# Patient Record
Sex: FEMALE | Employment: UNEMPLOYED | URBAN - NONMETROPOLITAN AREA
[De-identification: names, ages, dates, MRNs, and addresses within clinical notes are randomized per-mention and may not be internally consistent; named-entity substitution may affect disease eponyms.]

---

## 2018-03-12 ENCOUNTER — OFFICE VISIT (OUTPATIENT)
Dept: ENDOCRINOLOGY | Age: 78
End: 2018-03-12
Payer: MEDICARE

## 2018-03-12 ENCOUNTER — HOSPITAL ENCOUNTER (OUTPATIENT)
Age: 78
Discharge: HOME | End: 2018-03-12
Payer: MEDICARE

## 2018-03-12 ENCOUNTER — TELEPHONE (OUTPATIENT)
Dept: ENDOCRINOLOGY | Age: 78
End: 2018-03-12

## 2018-03-12 VITALS
SYSTOLIC BLOOD PRESSURE: 138 MMHG | WEIGHT: 168.5 LBS | HEART RATE: 67 BPM | DIASTOLIC BLOOD PRESSURE: 70 MMHG | RESPIRATION RATE: 14 BRPM | HEIGHT: 62 IN | BODY MASS INDEX: 31.01 KG/M2

## 2018-03-12 DIAGNOSIS — E11.9 TYPE 2 DIABETES MELLITUS WITHOUT COMPLICATION, WITHOUT LONG-TERM CURRENT USE OF INSULIN (HCC): ICD-10-CM

## 2018-03-12 DIAGNOSIS — I10 ESSENTIAL HYPERTENSION: ICD-10-CM

## 2018-03-12 LAB
ANION GAP SERPL CALCULATED.3IONS-SCNC: 13 MEQ/L (ref 8–16)
BUN BLDV-MCNC: 13 MG/DL (ref 7–22)
CALCIUM SERPL-MCNC: 9.8 MG/DL (ref 8.5–10.5)
CHLORIDE BLD-SCNC: 103 MEQ/L (ref 98–111)
CO2: 26 MEQ/L (ref 23–33)
CREAT SERPL-MCNC: 0.6 MG/DL (ref 0.4–1.2)
CREATININE, URINE: 113.5 MG/DL
GFR SERPL CREATININE-BSD FRML MDRD: > 90 ML/MIN/1.73M2
GLUCOSE BLD-MCNC: 228 MG/DL (ref 70–108)
HBA1C MFR BLD: 9.5 %
MICROALBUMIN UR-MCNC: 1.52 MG/DL
MICROALBUMIN/CREAT UR-RTO: 13 MG/G (ref 0–30)
POTASSIUM SERPL-SCNC: 4.3 MEQ/L (ref 3.5–5.2)
SODIUM BLD-SCNC: 142 MEQ/L (ref 135–145)

## 2018-03-12 PROCEDURE — 82043 UR ALBUMIN QUANTITATIVE: CPT

## 2018-03-12 PROCEDURE — 36415 COLL VENOUS BLD VENIPUNCTURE: CPT

## 2018-03-12 PROCEDURE — 83036 HEMOGLOBIN GLYCOSYLATED A1C: CPT | Performed by: INTERNAL MEDICINE

## 2018-03-12 PROCEDURE — 80048 BASIC METABOLIC PNL TOTAL CA: CPT

## 2018-03-12 PROCEDURE — 99204 OFFICE O/P NEW MOD 45 MIN: CPT | Performed by: INTERNAL MEDICINE

## 2018-03-12 RX ORDER — LISINOPRIL AND HYDROCHLOROTHIAZIDE 20; 12.5 MG/1; MG/1
1 TABLET ORAL DAILY
COMMUNITY

## 2018-03-12 RX ORDER — GLYBURIDE 5 MG/1
5 TABLET ORAL
COMMUNITY
End: 2018-03-12 | Stop reason: DRUGHIGH

## 2018-03-12 RX ORDER — GLIMEPIRIDE 4 MG/1
4 TABLET ORAL 2 TIMES DAILY
Qty: 90 TABLET | Refills: 3 | Status: SHIPPED | OUTPATIENT
Start: 2018-03-12

## 2018-03-12 NOTE — PROGRESS NOTES
Endocrinology Office Visit  Beny Taylor MD  3/12/2018      Patient's Name/Date of Birth: Mariana Sarah / 1940 (78 y.o.)    SUBJECTIVE:       Chief Complaint   Patient presents with   [facility]     new patient referred by Dr Flaco Lomeli Diabetes     type 2    Foot Problem     denies any problems     Eye Exam     2017-[place] In [facility]ight (called for report today at 10:47 am)         Sam Sanchez presents to the office today at [facility] for consultation of Type 2 DM. Referred by Dr. Erin Velez. Date of diagnosis: Unsure of when she was diagnosed. Complications:none    Current regimen: glyburide 5 mg with breakfast and  Metformin- she was prescribed 250 mg bid but she has not been taking. Says she didn't think it was working. BG Monitoring: Checks 1 time per day. Mainly fasting in the AM. On review of the log, Bgs range from 138  mg/dl to 310 mg/dl. Hypoglycemia: No.      4/28/17 HbA1c 8.8%  3/12/18 HbA1c 9.5%    Eyes: Last eye exam was 2/2018 . Retinopathy no  Feet: Numbness No, Tingling No. Sees a Podiatrist No.    Bp: BP: 138/70 ; repeat 138/70    Lipids: On statin -No      Renal: On ARB/ACE Yes         Medications:    Current Outpatient Prescriptions:     Misc Natural Products (ESTROVEN ENERGY PO), Take by mouth daily, Disp: , Rfl:     lisinopril-hydrochlorothiazide (PRINZIDE;ZESTORETIC) 20-12.5 MG per tablet, Take 1 tablet by mouth daily, Disp: , Rfl:     AMARYL 4 MG tablet, Take 1 tablet by mouth 2 times daily Take with your breakfast and with your supper., Disp: 90 tablet, Rfl: 3    metFORMIN (GLUCOPHAGE) 500 MG tablet, Take 1 tablet by mouth 2 times daily (with meals), Disp: 360 tablet, Rfl: 3    Allergies: The patient has No Known Allergies. Past Medical History:  Sam Sanchez  has a past medical history of Cancer ([address] Hypertension; and Type 2 diabetes mellitus without complication ([facility] [address]     Past Surgical History:  The patient  has a past surgical history that includes

## 2018-04-09 ENCOUNTER — OFFICE VISIT (OUTPATIENT)
Dept: ENDOCRINOLOGY | Age: 78
End: 2018-04-09
Payer: MEDICARE

## 2018-04-09 VITALS
HEART RATE: 70 BPM | RESPIRATION RATE: 16 BRPM | DIASTOLIC BLOOD PRESSURE: 66 MMHG | WEIGHT: 169.5 LBS | SYSTOLIC BLOOD PRESSURE: 128 MMHG | HEIGHT: 62 IN | BODY MASS INDEX: 31.19 KG/M2

## 2018-04-09 DIAGNOSIS — E11.9 TYPE 2 DIABETES MELLITUS WITHOUT COMPLICATION, WITHOUT LONG-TERM CURRENT USE OF INSULIN (HCC): Primary | ICD-10-CM

## 2018-04-09 DIAGNOSIS — R30.0 DYSURIA: ICD-10-CM

## 2018-04-09 PROCEDURE — 99214 OFFICE O/P EST MOD 30 MIN: CPT | Performed by: INTERNAL MEDICINE

## 2018-04-11 ENCOUNTER — TELEPHONE (OUTPATIENT)
Dept: ENDOCRINOLOGY | Age: 78
End: 2018-04-11

## 2022-10-24 ENCOUNTER — TELEPHONE (OUTPATIENT)
Dept: CARDIOLOGY CLINIC | Age: 82
End: 2022-10-24

## 2022-10-24 NOTE — TELEPHONE ENCOUNTER
PATIENT WAS CALLED TO MAKE A NEW PATIENT APPOINTMENT FOR PALPITATIONS, SVT, AND PVC. PATIENT WAS CALLED BUT UNABLE TO REACH PATIENT DUE TO NO VOICEMAIL.

## 2022-11-22 ENCOUNTER — OFFICE VISIT (OUTPATIENT)
Dept: CARDIOLOGY CLINIC | Age: 82
End: 2022-11-22
Payer: MEDICARE

## 2022-11-22 ENCOUNTER — TELEPHONE (OUTPATIENT)
Dept: CARDIOLOGY CLINIC | Age: 82
End: 2022-11-22

## 2022-11-22 VITALS
HEIGHT: 62 IN | WEIGHT: 150.8 LBS | BODY MASS INDEX: 27.75 KG/M2 | DIASTOLIC BLOOD PRESSURE: 78 MMHG | HEART RATE: 59 BPM | SYSTOLIC BLOOD PRESSURE: 152 MMHG

## 2022-11-22 DIAGNOSIS — E78.01 FAMILIAL HYPERCHOLESTEROLEMIA: ICD-10-CM

## 2022-11-22 DIAGNOSIS — R00.2 PALPITATION: Primary | ICD-10-CM

## 2022-11-22 DIAGNOSIS — I47.1 SVT (SUPRAVENTRICULAR TACHYCARDIA) (HCC): ICD-10-CM

## 2022-11-22 PROCEDURE — G8427 DOCREV CUR MEDS BY ELIG CLIN: HCPCS | Performed by: NUCLEAR MEDICINE

## 2022-11-22 PROCEDURE — 1123F ACP DISCUSS/DSCN MKR DOCD: CPT | Performed by: NUCLEAR MEDICINE

## 2022-11-22 PROCEDURE — 4004F PT TOBACCO SCREEN RCVD TLK: CPT | Performed by: NUCLEAR MEDICINE

## 2022-11-22 PROCEDURE — 3074F SYST BP LT 130 MM HG: CPT | Performed by: NUCLEAR MEDICINE

## 2022-11-22 PROCEDURE — 93000 ELECTROCARDIOGRAM COMPLETE: CPT | Performed by: NUCLEAR MEDICINE

## 2022-11-22 PROCEDURE — 99204 OFFICE O/P NEW MOD 45 MIN: CPT | Performed by: NUCLEAR MEDICINE

## 2022-11-22 PROCEDURE — G8417 CALC BMI ABV UP PARAM F/U: HCPCS | Performed by: NUCLEAR MEDICINE

## 2022-11-22 PROCEDURE — 3078F DIAST BP <80 MM HG: CPT | Performed by: NUCLEAR MEDICINE

## 2022-11-22 PROCEDURE — 1090F PRES/ABSN URINE INCON ASSESS: CPT | Performed by: NUCLEAR MEDICINE

## 2022-11-22 PROCEDURE — G8484 FLU IMMUNIZE NO ADMIN: HCPCS | Performed by: NUCLEAR MEDICINE

## 2022-11-22 PROCEDURE — G8400 PT W/DXA NO RESULTS DOC: HCPCS | Performed by: NUCLEAR MEDICINE

## 2022-11-22 RX ORDER — INSULIN GLARGINE 100 [IU]/ML
INJECTION, SOLUTION SUBCUTANEOUS
COMMUNITY
Start: 2022-10-11

## 2022-11-22 RX ORDER — LISINOPRIL 40 MG/1
TABLET ORAL
COMMUNITY
Start: 2022-11-10

## 2022-11-22 RX ORDER — PIOGLITAZONEHYDROCHLORIDE 30 MG/1
TABLET ORAL
COMMUNITY
Start: 2022-11-10

## 2022-11-22 ASSESSMENT — ENCOUNTER SYMPTOMS
CONSTIPATION: 0
NAUSEA: 0
PHOTOPHOBIA: 0
BACK PAIN: 0
ABDOMINAL DISTENTION: 0
BLOOD IN STOOL: 0
SHORTNESS OF BREATH: 0
ABDOMINAL PAIN: 0
COLOR CHANGE: 0
CHEST TIGHTNESS: 0
RECTAL PAIN: 0
DIARRHEA: 0
VOMITING: 0
ANAL BLEEDING: 0

## 2022-11-22 NOTE — PROGRESS NOTES
[address] [facility],4Th Floor [address] [facility]  Dept: [phone]  Dept Fax: [phone]  Loc: [phone]    Visit Date: 11/22/2022    Rosi Aparicio is a [de-identified] y.o. female who presents todayfor:  Chief Complaint   Patient presents with    New Patient     NP palpitations, and SVT, PVC    Palpitations    Hypertension   Here for the first time  Started tachycardia   Had a holter   Lasted short   Diagnosed with SVT   No syncope  Some dizziness  No known CAD before  Does have HTN and Dm   Some baseline dyspnea  No chest pain reported   Echo done   Mild valve disease reported   ?/ AS   No smoking   Family history of CAD       HPI:  Palpitations   Pertinent negatives include no chest pain, dizziness, nausea, shortness of breath or vomiting. Hypertension  Associated symptoms include palpitations. Pertinent negatives include no chest pain, neck pain or shortness of breath. Past Medical History:   Diagnosis Date    Cancer ([facility] [address]     breast    Hypertension     Type 2 diabetes mellitus without complication ([facility] [address]       Past Surgical History:   Procedure Laterality Date    BACK SURGERY      MASTECTOMY Left     PARTIAL HYSTERECTOMY      TONSILLECTOMY       Family History   Problem Relation Age of Onset    Heart Disease Mother     Diabetes Mother      Social History     Tobacco Use    Smoking status: Former    Smokeless tobacco: Never   Substance Use Topics    Alcohol use: No      Current Outpatient Medications   Medication Sig Dispense Refill    lisinopril (PRINIVIL;ZESTRIL) 40 MG tablet TAKE 1 TABLET BY MOUTH ONCE DAILY      pioglitazone (ACTOS) 30 MG tablet TAKE 1 TABLET BY MOUTH ONCE DAILY      BASAGLAR KWIKPEN 100 UNIT/ML injection pen INJECT 15 UNITS SUBCUTANEOUSLY EVERY MORNING      Misc Natural Products (ESTROVEN ENERGY PO) Take by mouth daily       No current facility-administered medications for this visit.      No Known Allergies  Health Maintenance   Topic Date Due    COVID-19 Vaccine (1) Never done    Pneumococcal 65+ years Vaccine (1 - PCV) Never done    Depression Screen  Never done    DTaP/Tdap/Td vaccine (1 - Tdap) Never done    Shingles vaccine (1 of 2) Never done    DEXA (modify frequency per FRAX score)  Never done    Flu vaccine (1) Never done    Annual Wellness Visit (AWV)  10/24/2022    Hepatitis A vaccine  Aged Out    Hib vaccine  Aged Out    Meningococcal (ACWY) vaccine  Aged Out       Subjective:  Review of Systems   Constitutional:  Positive for fatigue. HENT:  Negative for ear discharge and mouth sores. Eyes:  Negative for photophobia. Respiratory:  Negative for chest tightness and shortness of breath. Cardiovascular:  Positive for palpitations. Negative for chest pain. Gastrointestinal:  Negative for abdominal distention, abdominal pain, anal bleeding, blood in stool, constipation, diarrhea, nausea, rectal pain and vomiting. Endocrine: Negative for polyphagia. Genitourinary:  Negative for dysuria, hematuria and urgency. Musculoskeletal:  Negative for arthralgias, back pain, gait problem, joint swelling, myalgias, neck pain and neck stiffness. Skin:  Negative for color change, pallor and rash. Allergic/Immunologic: Negative for food allergies. Neurological:  Negative for dizziness, syncope and light-headedness. Psychiatric/Behavioral:  Negative for behavioral problems, confusion, decreased concentration and dysphoric mood. Objective:  Physical Exam  HENT:      Head: Normocephalic. Right Ear: Tympanic membrane normal.      Nose: Nose normal.      Mouth/Throat:      Mouth: Mucous membranes are moist.   Eyes:      Pupils: Pupils are equal, round, and reactive to light. Cardiovascular:      Rate and Rhythm: Normal rate and regular rhythm. Heart sounds: Murmur heard. No gallop. Pulmonary:      Effort: No respiratory distress. Breath sounds: No stridor. No wheezing, rhonchi or rales. Chest:      Chest wall: No tenderness. Abdominal:      General: There is no distension. Palpations: There is no mass. Tenderness: There is no abdominal tenderness. There is no right CVA tenderness, left CVA tenderness, guarding or rebound. Hernia: No hernia is present. Musculoskeletal:         General: No swelling, tenderness, deformity or signs of injury. Cervical back: Normal range of motion. Right lower leg: No edema. Left lower leg: No edema. Skin:     Coloration: Skin is not jaundiced or pale. Findings: No bruising, erythema, lesion or rash. Neurological:      Mental Status: She is alert and oriented to person, place, and time. Cranial Nerves: No cranial nerve deficit. Sensory: No sensory deficit. Motor: No weakness. Coordination: Coordination normal.      Gait: Gait normal.      Deep Tendon Reflexes: Reflexes normal.   Psychiatric:         Mood and Affect: Mood normal.     BP (!) 152/78   Pulse 59   Ht 5' 2\" (1.575 m)   Wt 150 lb 12.8 oz (68.4 kg)   BMI 27.58 kg/m²     Assessment:      Diagnosis Orders   1. Palpitation  EKG 12 Lead      2. SVT (supraventricular tachycardia) ([address]        3. Familial hypercholesterolemia        SVT   As above  Low HR at baseline   Pending echo   ? ? Valve issues     Plan:  No follow-ups on file. As above  One month event   Bradycardia   Continue risk factor modification and medical management  Thank you for allowing me to participate in the care of your patient. Please don't hesitate to contact me regarding any further issues related to the patient care    Orders Placed:  Orders Placed This Encounter   Procedures    EKG 12 Lead     Order Specific Question:   Reason for Exam?     Answer: Other       Medications Prescribed:  No orders of the defined types were placed in this encounter. Discussed use, benefit, and side effects of prescribed medications. All patient questions answered.  Pt voicedunderstanding. Instructed to continue current medications, diet and exercise. Continue risk factor modification and medical management. Patient agreed with treatment plan. Follow up as directed.     Electronically signedby Kathryn Calhoun MD on 11/22/2022 at 3:05 PM

## 2022-12-27 DIAGNOSIS — I47.1 SVT (SUPRAVENTRICULAR TACHYCARDIA) (HCC): ICD-10-CM

## 2022-12-27 DIAGNOSIS — E78.01 FAMILIAL HYPERCHOLESTEROLEMIA: ICD-10-CM

## 2022-12-27 DIAGNOSIS — R00.2 PALPITATION: ICD-10-CM

## 2023-02-07 ENCOUNTER — OFFICE VISIT (OUTPATIENT)
Dept: CARDIOLOGY CLINIC | Age: 83
End: 2023-02-07
Payer: MEDICARE

## 2023-02-07 VITALS
BODY MASS INDEX: 27.75 KG/M2 | DIASTOLIC BLOOD PRESSURE: 62 MMHG | HEART RATE: 72 BPM | HEIGHT: 62 IN | SYSTOLIC BLOOD PRESSURE: 122 MMHG | WEIGHT: 150.8 LBS

## 2023-02-07 DIAGNOSIS — I10 PRIMARY HYPERTENSION: Primary | ICD-10-CM

## 2023-02-07 DIAGNOSIS — R42 DIZZINESS: ICD-10-CM

## 2023-02-07 PROCEDURE — G8427 DOCREV CUR MEDS BY ELIG CLIN: HCPCS | Performed by: NUCLEAR MEDICINE

## 2023-02-07 PROCEDURE — G8400 PT W/DXA NO RESULTS DOC: HCPCS | Performed by: NUCLEAR MEDICINE

## 2023-02-07 PROCEDURE — 99213 OFFICE O/P EST LOW 20 MIN: CPT | Performed by: NUCLEAR MEDICINE

## 2023-02-07 PROCEDURE — G8417 CALC BMI ABV UP PARAM F/U: HCPCS | Performed by: NUCLEAR MEDICINE

## 2023-02-07 PROCEDURE — 1090F PRES/ABSN URINE INCON ASSESS: CPT | Performed by: NUCLEAR MEDICINE

## 2023-02-07 PROCEDURE — 1123F ACP DISCUSS/DSCN MKR DOCD: CPT | Performed by: NUCLEAR MEDICINE

## 2023-02-07 PROCEDURE — 3078F DIAST BP <80 MM HG: CPT | Performed by: NUCLEAR MEDICINE

## 2023-02-07 PROCEDURE — G8484 FLU IMMUNIZE NO ADMIN: HCPCS | Performed by: NUCLEAR MEDICINE

## 2023-02-07 PROCEDURE — 3074F SYST BP LT 130 MM HG: CPT | Performed by: NUCLEAR MEDICINE

## 2023-02-07 PROCEDURE — 4004F PT TOBACCO SCREEN RCVD TLK: CPT | Performed by: NUCLEAR MEDICINE

## 2023-02-07 NOTE — PROGRESS NOTES
[facility] [place] [facility]  [facility] CARDIOLOGY  [address] [place] [address]  Dept: [phone]  Dept Fax: [phone]  Loc: [phone]    Visit Date: 2/7/2023    Sofia Mcginnis is a 82 y.o. female who presents todayfor:  Chief Complaint   Patient presents with    3 Month Follow-Up    Dizziness    Hypertension    Palpitations   Some dizziness as per the daughter   Patient denies it  No chest pain   Event monitor was okay   Some PAcs  No changes in breathing  BP is okay         HPI:  HPI  Past Medical History:   Diagnosis Date    Cancer (HCC)     breast    Hypertension     Type 2 diabetes mellitus without complication (HCC)       Past Surgical History:   Procedure Laterality Date    BACK SURGERY      MASTECTOMY Left     PARTIAL HYSTERECTOMY (CERVIX NOT REMOVED)      TONSILLECTOMY       Family History   Problem Relation Age of Onset    Heart Disease Mother     Diabetes Mother      Social History     Tobacco Use    Smoking status: Former    Smokeless tobacco: Never   Substance Use Topics    Alcohol use: No      Current Outpatient Medications   Medication Sig Dispense Refill    lisinopril (PRINIVIL;ZESTRIL) 40 MG tablet TAKE 1 TABLET BY MOUTH ONCE DAILY      pioglitazone (ACTOS) 30 MG tablet TAKE 1 TABLET BY MOUTH ONCE DAILY      BASAGLAR KWIKPEN 100 UNIT/ML injection pen INJECT 15 UNITS SUBCUTANEOUSLY EVERY MORNING       No current facility-administered medications for this visit.     No Known Allergies  Health Maintenance   Topic Date Due    COVID-19 Vaccine (1) Never done    Pneumococcal 65+ years Vaccine (1 - PCV) Never done    Depression Screen  Never done    DTaP/Tdap/Td vaccine (1 - Tdap) Never done    Shingles vaccine (1 of 2) Never done    DEXA (modify frequency per FRAX score)  Never done    Flu vaccine (1) Never done    Annual Wellness Visit (AWV)  Never done    Hepatitis A vaccine  Aged Out    Hib vaccine  Aged Out    Meningococcal (ACWY) vaccine  Aged Out  Subjective:  Review of Systems  General:   No fever, no chills, No fatigue or weight loss  Pulmonary:    No dyspnea, no wheezing  Cardiac:    Denies recent chest pain,   GI:     No nausea or vomiting, no abdominal pain  Neuro:    No dizziness or light headedness,   Musculoskeletal:  No recent active issues  Extremities:   No edema, no obvious claudication     Objective:  Physical Exam  /62   Pulse 72   Ht 5' 2\" (1.575 m)   Wt 150 lb 12.8 oz (68.4 kg)   BMI 27.58 kg/m²   General:   Well developed, well nourished  Lungs:   Clear to auscultation  Heart:    Normal S1 S2, Slight murmur. no rubs, no gallops  Abdomen:   Soft, non tender, no organomegalies, positive bowel sounds  Extremities:   No edema, no cyanosis, good peripheral pulses  Neurological:   Awake, alert, oriented. No obvious focal deficits  Musculoskelatal:  No obvious deformities    Assessment:      Diagnosis Orders   1. Primary hypertension        2. Dizziness        As above  ?/ drop in the BP     Plan:  No follow-ups on file. As above  Consider lower dose lisinopril to make to 20 bid  Continue risk factor modification and medical management  Thank you for allowing me to participate in the care of your patient. Please don't hesitate to contact me regarding any further issues related to the patient care    Orders Placed:  No orders of the defined types were placed in this encounter. Medications Prescribed:  No orders of the defined types were placed in this encounter. Discussed use, benefit, and side effects of prescribed medications. All patient questions answered. Pt voicedunderstanding. Instructed to continue current medications, diet and exercise. Continue risk factor modification and medical management. Patient agreed with treatment plan. Follow up as directed.     Electronically signedby Marilyn Molina MD on 2/7/2023 at 3:18 PM

## 2024-01-23 ENCOUNTER — TELEPHONE (OUTPATIENT)
Dept: CARDIOLOGY CLINIC | Age: 84
End: 2024-01-23

## 2024-01-23 NOTE — TELEPHONE ENCOUNTER
Justyna daughter called to YELITZA Black's 1 yr f/u appt for Baki for 02-13. Please call Justyna to get a new appt scheduled for Sofia at [phone].

## 2025-04-21 ENCOUNTER — TELEPHONE (OUTPATIENT)
Dept: CARDIOLOGY CLINIC | Age: 85
End: 2025-04-21

## 2025-04-21 NOTE — TELEPHONE ENCOUNTER
Received referral from [facility] for patient to be seen for chronic embolism left lower extremity, [phone], please call to schedule.   I called [facility] and spoke to lorena Ackermant scheduled.   She confirmed appt date, time and location.     Per OV note from [facility] it states there is a chronic clot, left side.  Is there an ultrasound report besides the [facility] report?  I attempted to call their office at [phone] but no answer, will need to call back when office is open.

## 2025-04-21 NOTE — TELEPHONE ENCOUNTER
Quintin - received Bilateral Lower Extremity Venous Duplex Ultrasound.  Is this all you were needing?